# Patient Record
Sex: MALE | Race: WHITE | NOT HISPANIC OR LATINO | Employment: FULL TIME | URBAN - METROPOLITAN AREA
[De-identification: names, ages, dates, MRNs, and addresses within clinical notes are randomized per-mention and may not be internally consistent; named-entity substitution may affect disease eponyms.]

---

## 2019-08-07 ENCOUNTER — OFFICE VISIT (OUTPATIENT)
Dept: FAMILY MEDICINE | Facility: CLINIC | Age: 54
End: 2019-08-07
Payer: COMMERCIAL

## 2019-08-07 VITALS
SYSTOLIC BLOOD PRESSURE: 146 MMHG | HEIGHT: 72 IN | OXYGEN SATURATION: 98 % | DIASTOLIC BLOOD PRESSURE: 88 MMHG | HEART RATE: 105 BPM | TEMPERATURE: 98 F | WEIGHT: 260.25 LBS | BODY MASS INDEX: 35.25 KG/M2

## 2019-08-07 DIAGNOSIS — I80.9 PHLEBITIS ALONE: ICD-10-CM

## 2019-08-07 DIAGNOSIS — Z76.89 ENCOUNTER TO ESTABLISH CARE: Primary | ICD-10-CM

## 2019-08-07 DIAGNOSIS — I82.811 EMBOLISM FROM RIGHT GREATER SAPHENOUS VEIN: ICD-10-CM

## 2019-08-07 DIAGNOSIS — F17.200 SMOKER: ICD-10-CM

## 2019-08-07 DIAGNOSIS — I10 ESSENTIAL HYPERTENSION: ICD-10-CM

## 2019-08-07 DIAGNOSIS — N20.0 KIDNEY STONE: ICD-10-CM

## 2019-08-07 DIAGNOSIS — Z09 HOSPITAL DISCHARGE FOLLOW-UP: ICD-10-CM

## 2019-08-07 PROBLEM — I82.401 ACUTE DEEP VEIN THROMBOSIS (DVT) OF RIGHT LOWER EXTREMITY: Status: ACTIVE | Noted: 2019-08-07

## 2019-08-07 PROCEDURE — 3008F BODY MASS INDEX DOCD: CPT | Mod: CPTII,S$GLB,, | Performed by: FAMILY MEDICINE

## 2019-08-07 PROCEDURE — 99203 OFFICE O/P NEW LOW 30 MIN: CPT | Mod: S$GLB,,, | Performed by: FAMILY MEDICINE

## 2019-08-07 PROCEDURE — 99203 PR OFFICE/OUTPT VISIT, NEW, LEVL III, 30-44 MIN: ICD-10-PCS | Mod: S$GLB,,, | Performed by: FAMILY MEDICINE

## 2019-08-07 PROCEDURE — 3008F PR BODY MASS INDEX (BMI) DOCUMENTED: ICD-10-PCS | Mod: CPTII,S$GLB,, | Performed by: FAMILY MEDICINE

## 2019-08-07 RX ORDER — APIXABAN 5 MG/1
1 TABLET, FILM COATED ORAL DAILY
COMMUNITY
Start: 2019-08-06

## 2019-08-07 RX ORDER — LISINOPRIL 10 MG/1
10 TABLET ORAL DAILY
COMMUNITY
End: 2019-08-21

## 2019-08-07 RX ORDER — FUROSEMIDE 20 MG/1
1 TABLET ORAL DAILY
COMMUNITY
Start: 2019-07-09 | End: 2019-08-21

## 2019-08-07 NOTE — LETTER
August 7, 2019      [place] [address] [address] [place] [address]  Phone: [phone]  Fax: [phone]       Patient: Yaya Dykes   YOB: 1965  Date of Visit: 08/07/2019    To Whom It May Concern:    Jean Dykes  was at [facility] on 08/07/2019. He may return to work/school on 8/8/19 without restrictions. If you have any questions or concerns, or if I can be of further assistance, please do not hesitate to contact me.    Sincerely,    Parul Clement MD    pls note that I never restricted him from work- this is a new pt who as of right now has no restrictions placed upon him

## 2019-08-07 NOTE — PATIENT INSTRUCTIONS
Take the xarelto samples for your prophylactic treatment.  We will monitor the kidney stone - either go to ER or come to us for worsening symptoms such as blood in urine, pain in lower back etc.   Drink even more fluid than what you drink now.   No lasix necessary right now

## 2019-08-07 NOTE — PROGRESS NOTES
Subjective:       Patient ID: Yaya Dykes is a 54 y.o. male.    Chief Complaint: Leg Pain (Pt stated that he has had right leg pain. Pt stated that he went into the ER & was told he had superficial thrombophlebitis. Pt stated that lately his blood pressure has been running very low.)    55 yo M here to establish care. pMHx of HTN for which he takes lisinopril. Pt has not really been seen by PCP in the past. Last month pt went to [facility] ER for chest pain which turned out to be severe dehydration. Pt's creatinine was as high as > 5 and after 3 ltr of NS it was still around 3. GFR around 23 to 40s. Pt only micturated 440 ml during that time. Pt was discharged as it was assumed that pt has CKD - 2 weeks later went to hospitals ER for dehydration (low BP, feeling thirsty) - he was given 2 more liters of fluid and his kidneys NORMALIZED. He was also found to have a 9 mm non-obstructing kidney stone.   Also he was given an ultrasound of his right leg. Pt was found to have a superficial thrombosis in the great saphenous vein and proximity. Pt also has phlebitis.   As per guidelines (UpToDate), superficial thrombosis such as in the saphenous vein can be treated for 6 weeks as a preventive measure.     Review of Systems   Constitutional: Negative for chills, fatigue and fever.   HENT: Negative for congestion, drooling, sneezing and sore throat.    Eyes: Negative for pain and visual disturbance.   Respiratory: Negative for cough and shortness of breath.    Cardiovascular: Negative for chest pain.   Gastrointestinal: Negative for abdominal pain, constipation, diarrhea and nausea.   Endocrine: Negative for cold intolerance and heat intolerance.   Genitourinary: Negative for difficulty urinating and frequency.   Musculoskeletal: Negative for myalgias.   Allergic/Immunologic: Negative for food allergies.   Neurological: Negative for seizures and headaches.   Psychiatric/Behavioral: Negative for behavioral problems.        Objective:      Physical Exam   Constitutional: He appears well-developed.   HENT:   Right Ear: External ear normal.   Left Ear: External ear normal.   Mouth/Throat: Oropharynx is clear and moist.   Eyes: Conjunctivae and EOM are normal.   Neck: Normal range of motion.   Cardiovascular: Normal rate, regular rhythm and intact distal pulses.   Pulmonary/Chest: Effort normal and breath sounds normal.   Abdominal: Soft.   Musculoskeletal: Normal range of motion.   Neurological: He is alert.   Skin: Skin is warm. Capillary refill takes less than 2 seconds.   Psychiatric: He has a normal mood and affect.   Nursing note and vitals reviewed.      Assessment:       1. Encounter to establish care    2. Smoker    3. Hospital discharge follow-up    4. Kidney stone    5. Essential hypertension    6. Embolism from right greater saphenous vein        Plan:       PROBLEM LIST     Yaya was seen today for leg pain.    Diagnoses and all orders for this visit:    Encounter to establish care    Smoker    Hospital discharge follow-up    Kidney stone  Comments:  non-obstructing 9 mm mid-renal left kidney    Essential hypertension    Embolism from right greater saphenous vein  Comments:  8/2    Take the xarelto samples for your prophylactic treatment.  We will monitor the kidney stone - either go to ER or come to us for worsening symptoms such as blood in urine, pain in lower back etc.   Drink even more fluid than what you drink now.   No lasix necessary right now

## 2019-08-21 ENCOUNTER — OFFICE VISIT (OUTPATIENT)
Dept: FAMILY MEDICINE | Facility: CLINIC | Age: 54
End: 2019-08-21
Payer: COMMERCIAL

## 2019-08-21 VITALS
OXYGEN SATURATION: 97 % | HEIGHT: 72 IN | TEMPERATURE: 98 F | HEART RATE: 100 BPM | WEIGHT: 263.38 LBS | BODY MASS INDEX: 35.67 KG/M2 | SYSTOLIC BLOOD PRESSURE: 142 MMHG | DIASTOLIC BLOOD PRESSURE: 88 MMHG

## 2019-08-21 DIAGNOSIS — N40.0 BENIGN PROSTATIC HYPERPLASIA, UNSPECIFIED WHETHER LOWER URINARY TRACT SYMPTOMS PRESENT: ICD-10-CM

## 2019-08-21 DIAGNOSIS — I10 ESSENTIAL HYPERTENSION: ICD-10-CM

## 2019-08-21 DIAGNOSIS — Z09 HOSPITAL DISCHARGE FOLLOW-UP: Primary | ICD-10-CM

## 2019-08-21 DIAGNOSIS — N17.9 AKI (ACUTE KIDNEY INJURY): ICD-10-CM

## 2019-08-21 DIAGNOSIS — N20.0 KIDNEY STONE ON LEFT SIDE: ICD-10-CM

## 2019-08-21 PROCEDURE — 3008F PR BODY MASS INDEX (BMI) DOCUMENTED: ICD-10-PCS | Mod: CPTII,S$GLB,, | Performed by: FAMILY MEDICINE

## 2019-08-21 PROCEDURE — 99213 PR OFFICE/OUTPT VISIT, EST, LEVL III, 20-29 MIN: ICD-10-PCS | Mod: S$GLB,,, | Performed by: FAMILY MEDICINE

## 2019-08-21 PROCEDURE — 99213 OFFICE O/P EST LOW 20 MIN: CPT | Mod: S$GLB,,, | Performed by: FAMILY MEDICINE

## 2019-08-21 PROCEDURE — 3008F BODY MASS INDEX DOCD: CPT | Mod: CPTII,S$GLB,, | Performed by: FAMILY MEDICINE

## 2019-08-21 RX ORDER — GABAPENTIN 100 MG/1
1 CAPSULE ORAL 2 TIMES DAILY
COMMUNITY
Start: 2019-08-12 | End: 2019-11-04 | Stop reason: SDUPTHER

## 2019-08-21 RX ORDER — ONDANSETRON 4 MG/1
1 TABLET, ORALLY DISINTEGRATING ORAL
COMMUNITY
Start: 2019-08-09

## 2019-08-21 RX ORDER — TAMSULOSIN HYDROCHLORIDE 0.4 MG/1
1 CAPSULE ORAL DAILY
COMMUNITY
Start: 2019-08-12 | End: 2019-08-21 | Stop reason: SDUPTHER

## 2019-08-21 RX ORDER — FAMOTIDINE 20 MG/1
1 TABLET, FILM COATED ORAL DAILY
COMMUNITY
Start: 2019-08-12 | End: 2019-12-02

## 2019-08-21 RX ORDER — TAMSULOSIN HYDROCHLORIDE 0.4 MG/1
0.4 CAPSULE ORAL DAILY
Qty: 90 CAPSULE | Refills: 3 | Status: SHIPPED | OUTPATIENT
Start: 2019-08-21

## 2019-08-21 RX ORDER — AMLODIPINE BESYLATE 5 MG/1
1 TABLET ORAL DAILY
COMMUNITY
Start: 2019-08-12 | End: 2019-08-21 | Stop reason: SDUPTHER

## 2019-08-21 RX ORDER — AMLODIPINE BESYLATE 5 MG/1
5 TABLET ORAL DAILY
Qty: 90 TABLET | Refills: 3 | Status: SHIPPED | OUTPATIENT
Start: 2019-08-21 | End: 2019-11-04

## 2019-08-21 NOTE — PROGRESS NOTES
Subjective:       Patient ID: Yaya Dykes is a 54 y.o. male.    Chief Complaint: Hospital Follow Up (Pt is here to get a release to go back to work.)    55 yo M here for hospital discharge f/u for dehydration/kidney stones 10 days ago. Pt also had associated kidney failure. Pt got fluids and was put on flomax which he is still taking.   Pt also says that he does not have a blood clot anymore.   Pt was taken off of lisinopril and put on amlodipine 5 mg and flomax.   Pt feels well now and he is ready to go back to work. He is drinking a lot of water and he can urinate.   No other problems or concerns at this time.     Review of Systems   Constitutional: Negative for chills, fatigue and fever.   HENT: Negative for congestion, drooling, sneezing and sore throat.    Eyes: Negative for pain and visual disturbance.   Respiratory: Negative for cough and shortness of breath.    Cardiovascular: Negative for chest pain.   Gastrointestinal: Negative for abdominal pain, constipation, diarrhea and nausea.   Endocrine: Negative for cold intolerance and heat intolerance.   Genitourinary: Negative for difficulty urinating and frequency.   Musculoskeletal: Negative for myalgias.   Allergic/Immunologic: Negative for food allergies.   Neurological: Negative for seizures and headaches.   Psychiatric/Behavioral: Negative for behavioral problems.       Objective:      Physical Exam   Constitutional: He appears well-developed.   HENT:   Right Ear: External ear normal.   Left Ear: External ear normal.   Mouth/Throat: Oropharynx is clear and moist.   Eyes: Conjunctivae and EOM are normal.   Neck: Normal range of motion.   Cardiovascular: Normal rate, regular rhythm and intact distal pulses.   Pulmonary/Chest: Effort normal and breath sounds normal.   Abdominal: Soft.   Musculoskeletal: Normal range of motion.   Neurological: He is alert.   Skin: Skin is warm. Capillary refill takes less than 2 seconds.   Psychiatric: He has a normal mood  and affect.   Nursing note and vitals reviewed.      Assessment:       1. Hospital discharge follow-up    2. Essential hypertension    3. GINI (acute kidney injury)    4. Kidney stone on left side    5. Benign prostatic hyperplasia, unspecified whether lower urinary tract symptoms present        Plan:       PROBLEM LIST     Yaya was seen today for hospital follow up.    Diagnoses and all orders for this visit:    Hospital discharge follow-up  Comments:  for dehydration/kidney stones    Essential hypertension  -     amLODIPine (NORVASC) 5 MG tablet; Take 1 tablet (5 mg total) by mouth once daily.    GINI (acute kidney injury)    Kidney stone on left side    Benign prostatic hyperplasia, unspecified whether lower urinary tract symptoms present  -     tamsulosin (FLOMAX) 0.4 mg Cap; Take 1 capsule (0.4 mg total) by mouth once daily.

## 2019-08-21 NOTE — LETTER
August 21, 2019      [place] [address] [address] [place] [address]  Phone: [phone]  Fax: [phone]       Patient: Yaya Dykes   YOB: 1965  Date of Visit: 08/21/2019    To Whom It May Concern:    Jean Dykes  was at [facility] on 08/21/2019. He may return to work/school on 8/22/2019 without any restrictions. If you have any questions or concerns, or if I can be of further assistance, please do not hesitate to contact me.    Sincerely,    Parul Clement MD

## 2019-11-04 ENCOUNTER — OFFICE VISIT (OUTPATIENT)
Dept: FAMILY MEDICINE | Facility: CLINIC | Age: 54
End: 2019-11-04
Payer: COMMERCIAL

## 2019-11-04 VITALS
DIASTOLIC BLOOD PRESSURE: 102 MMHG | OXYGEN SATURATION: 98 % | HEART RATE: 78 BPM | TEMPERATURE: 98 F | WEIGHT: 261.13 LBS | HEIGHT: 72 IN | BODY MASS INDEX: 35.37 KG/M2 | SYSTOLIC BLOOD PRESSURE: 148 MMHG

## 2019-11-04 DIAGNOSIS — Z09 HOSPITAL DISCHARGE FOLLOW-UP: Primary | ICD-10-CM

## 2019-11-04 DIAGNOSIS — I82.811 EMBOLISM FROM RIGHT GREATER SAPHENOUS VEIN: ICD-10-CM

## 2019-11-04 DIAGNOSIS — F17.200 SMOKER: ICD-10-CM

## 2019-11-04 DIAGNOSIS — I21.9 MYOCARDIAL INFARCTION, UNSPECIFIED MI TYPE, UNSPECIFIED ARTERY: ICD-10-CM

## 2019-11-04 DIAGNOSIS — G62.9 NEUROPATHY: ICD-10-CM

## 2019-11-04 DIAGNOSIS — I10 ESSENTIAL HYPERTENSION: ICD-10-CM

## 2019-11-04 PROCEDURE — 3008F BODY MASS INDEX DOCD: CPT | Mod: CPTII,S$GLB,, | Performed by: FAMILY MEDICINE

## 2019-11-04 PROCEDURE — 99214 OFFICE O/P EST MOD 30 MIN: CPT | Mod: S$GLB,,, | Performed by: FAMILY MEDICINE

## 2019-11-04 PROCEDURE — 99214 PR OFFICE/OUTPT VISIT, EST, LEVL IV, 30-39 MIN: ICD-10-PCS | Mod: S$GLB,,, | Performed by: FAMILY MEDICINE

## 2019-11-04 PROCEDURE — 3008F PR BODY MASS INDEX (BMI) DOCUMENTED: ICD-10-PCS | Mod: CPTII,S$GLB,, | Performed by: FAMILY MEDICINE

## 2019-11-04 RX ORDER — GABAPENTIN 100 MG/1
300 CAPSULE ORAL 2 TIMES DAILY
Qty: 60 CAPSULE | Refills: 0 | Status: SHIPPED | OUTPATIENT
Start: 2019-11-04 | End: 2020-07-17

## 2019-11-04 RX ORDER — ATORVASTATIN CALCIUM 80 MG/1
1 TABLET, FILM COATED ORAL DAILY
COMMUNITY
Start: 2019-10-26

## 2019-11-04 RX ORDER — CARVEDILOL 6.25 MG/1
1 TABLET ORAL 2 TIMES DAILY
COMMUNITY
Start: 2019-10-26

## 2019-11-04 RX ORDER — AMLODIPINE BESYLATE 10 MG/1
10 TABLET ORAL DAILY
Qty: 30 TABLET | Refills: 0 | Status: SHIPPED | OUTPATIENT
Start: 2019-11-04

## 2019-11-04 RX ORDER — CLONIDINE HYDROCHLORIDE 0.1 MG/1
0.1 TABLET ORAL DAILY PRN
Qty: 30 TABLET | Refills: 0 | Status: SHIPPED | OUTPATIENT
Start: 2019-11-04

## 2019-11-04 RX ORDER — ASPIRIN 325 MG
325 TABLET ORAL DAILY
COMMUNITY

## 2019-11-04 RX ORDER — IBUPROFEN 200 MG
TABLET ORAL
COMMUNITY
Start: 2019-10-28

## 2019-11-04 NOTE — PROGRESS NOTES
"Subjective:       Patient ID: Yaya Dykes is a 54 y.o. male.    Chief Complaint: Hospital Follow Up (Pt stated that he went in to [facility] on 10/24/19 for a heart attack. Pt stated that he was in for only 1 day and Dr Nunez put in a stint due to 100% blockage.) and Medication Refill (Pt stated that he needs all his meds refilled sent to [facility] in [facility])    53 yo M here for hospital discharge f/u on MI. Pt had a heart attack 2 weeks ago - he felt a stabbing pain in his chest and he knew right away that he had a heart attack. He was airlifted to the hospital and he was given a stent in his right cardiac artery - drug eluting by Dr Nunez. Pt feels much better now and he has stopped smoking since that incident. He is on patches right now.   Pt still has some chest pain and he also has uncontrolled HTN. Discussed to increase his amlodipine to 10 mg and also have him take a clonidine if his BP goes over 160/90. Discussed that he really needs to keep his BP low. Pt thinks that it has been controlled till just about 2 days ago. He had an appt with his cardiologist and apparently his BP was alright at that time.  Pt will see his cardiologist for a stress test this coming Friday.   Pt would like to have gabapentin refilled and best increased if possible. He was put on gabapentin for his pain on the sides of his thigh. He states the meds work "soso".    Review of Systems   Constitutional: Negative for chills, fatigue and fever.   HENT: Negative for congestion, drooling, sneezing and sore throat.    Eyes: Negative for pain and visual disturbance.   Respiratory: Negative for cough and shortness of breath.    Cardiovascular: Negative for chest pain.   Gastrointestinal: Negative for abdominal pain, constipation, diarrhea and nausea.   Endocrine: Negative for cold intolerance and heat intolerance.   Genitourinary: Negative for difficulty urinating and frequency.   Musculoskeletal: Negative for myalgias. "   Allergic/Immunologic: Negative for food allergies.   Neurological: Negative for seizures and headaches.   Psychiatric/Behavioral: Negative for behavioral problems.       Objective:      Physical Exam   Constitutional: He appears well-developed.   HENT:   Right Ear: External ear normal.   Left Ear: External ear normal.   Mouth/Throat: Oropharynx is clear and moist.   Eyes: Conjunctivae and EOM are normal.   Neck: Normal range of motion.   Cardiovascular: Normal rate, regular rhythm and intact distal pulses.   Pulmonary/Chest: Effort normal and breath sounds normal.   Abdominal: Soft.   Musculoskeletal: Normal range of motion.   Neurological: He is alert.   Skin: Skin is warm. Capillary refill takes less than 2 seconds.   Psychiatric: He has a normal mood and affect.   Nursing note and vitals reviewed.      Assessment:       1. Hospital discharge follow-up    2. Myocardial infarction, unspecified MI type, unspecified artery    3. Essential hypertension    4. Embolism from right greater saphenous vein    5. Smoker    6. Neuropathy        Plan:       PROBLEM LIST     Yaya was seen today for hospital follow up and medication refill.    Diagnoses and all orders for this visit:    Hospital discharge follow-up    Myocardial infarction, unspecified MI type, unspecified artery  Comments:  drug eluding stent in RCA (Dr Nunez) 10/24/19    Essential hypertension  Comments:  not controlled  Orders:  -     amLODIPine (NORVASC) 10 MG tablet; Take 1 tablet (10 mg total) by mouth once daily.  -     cloNIDine (CATAPRES) 0.1 MG tablet; Take 1 tablet (0.1 mg total) by mouth daily as needed (chest pain).    Embolism from right greater saphenous vein    Smoker  Comments:  stoped since MI 10/24/19 - on patches currently    Neuropathy  -     gabapentin (NEURONTIN) 100 MG capsule; Take 3 capsules (300 mg total) by mouth 2 (two) times daily.

## 2019-11-04 NOTE — PATIENT INSTRUCTIONS
Ask Dr Nunez if nitro pills are okay to use, if he agrees you being on clonidine and if pletal is an option for you in addition to all of it.

## 2019-11-05 ENCOUNTER — TELEPHONE (OUTPATIENT)
Dept: UROLOGY | Facility: CLINIC | Age: 54
End: 2019-11-05

## 2019-11-05 NOTE — TELEPHONE ENCOUNTER
----- Message from Rosibel Brenner sent at 11/5/2019  1:08 PM CST -----  Contact: pt   Type:  Patient Returning Call    Who Called:pt  Who Left Message for Patient:na  Does the patient know what this is regarding?:na  Would the patient rather a call back or a response via [facility]? Call back   Best Call Back Number:[phone]   Additional Information: na

## 2019-11-05 NOTE — TELEPHONE ENCOUNTER
Spoke with patient, requesting lab/test results, states no follow up appt made, gave patient a follow up appt for 11/25 at 1141

## 2019-11-25 PROBLEM — Z09 HOSPITAL DISCHARGE FOLLOW-UP: Status: RESOLVED | Noted: 2019-08-21 | Resolved: 2019-11-25

## 2019-12-02 ENCOUNTER — OFFICE VISIT (OUTPATIENT)
Dept: FAMILY MEDICINE | Facility: CLINIC | Age: 54
End: 2019-12-02
Payer: COMMERCIAL

## 2019-12-02 VITALS
HEIGHT: 72 IN | TEMPERATURE: 99 F | OXYGEN SATURATION: 96 % | BODY MASS INDEX: 35.84 KG/M2 | HEART RATE: 91 BPM | DIASTOLIC BLOOD PRESSURE: 92 MMHG | RESPIRATION RATE: 16 BRPM | WEIGHT: 264.63 LBS | SYSTOLIC BLOOD PRESSURE: 144 MMHG

## 2019-12-02 DIAGNOSIS — K21.9 GASTROESOPHAGEAL REFLUX DISEASE, ESOPHAGITIS PRESENCE NOT SPECIFIED: ICD-10-CM

## 2019-12-02 DIAGNOSIS — R07.9 CHEST PAIN, UNSPECIFIED TYPE: ICD-10-CM

## 2019-12-02 DIAGNOSIS — R60.9 SWELLING: ICD-10-CM

## 2019-12-02 DIAGNOSIS — Z95.5 H/O HEART ARTERY STENT: ICD-10-CM

## 2019-12-02 DIAGNOSIS — I21.9 MYOCARDIAL INFARCTION, UNSPECIFIED MI TYPE, UNSPECIFIED ARTERY: Primary | ICD-10-CM

## 2019-12-02 PROCEDURE — 99214 OFFICE O/P EST MOD 30 MIN: CPT | Mod: S$GLB,,, | Performed by: FAMILY MEDICINE

## 2019-12-02 PROCEDURE — 3008F BODY MASS INDEX DOCD: CPT | Mod: CPTII,S$GLB,, | Performed by: FAMILY MEDICINE

## 2019-12-02 PROCEDURE — 3008F PR BODY MASS INDEX (BMI) DOCUMENTED: ICD-10-PCS | Mod: CPTII,S$GLB,, | Performed by: FAMILY MEDICINE

## 2019-12-02 PROCEDURE — 99214 PR OFFICE/OUTPT VISIT, EST, LEVL IV, 30-39 MIN: ICD-10-PCS | Mod: S$GLB,,, | Performed by: FAMILY MEDICINE

## 2019-12-02 RX ORDER — FUROSEMIDE 20 MG/1
20 TABLET ORAL DAILY PRN
Qty: 30 TABLET | Refills: 11 | Status: SHIPPED | OUTPATIENT
Start: 2019-12-02 | End: 2020-12-01

## 2019-12-02 RX ORDER — ESOMEPRAZOLE MAGNESIUM 40 MG/1
40 CAPSULE, DELAYED RELEASE ORAL
Qty: 30 CAPSULE | Refills: 11 | Status: SHIPPED | OUTPATIENT
Start: 2019-12-02 | End: 2020-12-01

## 2019-12-02 NOTE — PROGRESS NOTES
"Subjective:       Patient ID: Yaya Dykes is a 54 y.o. male.    Chief Complaint: Follow-up (pt is here to follow up on his BP medication.); Shoulder Pain (pt states that at times his left shoulder hurts); Chest Pain (pt states that at night he gets chest pains and also throws up during the night while sleeping. ); and Edema (pt states that his hands and legs are swelling alot. )    53 yo M here for f/u on HTN and on other symptoms that are similar to the ones he had just prior to when he was dx'd with MI and got a stent in his RCA.  Pt has Hx of MI w/ stent 1 month ago. Pt has CP now x 2 weeks - did not have it when he saw Dr Nunez 2+ weeks ago. Now he has CP which is similar to the CP before he got the stent as well as "puking" in AM - just like before the MI. He now also has shoulder pain which is a little different from before. Pt thinks it is more like indigestion this time even though it is the same kind of problems from before the MI.   Pt also has experienced increased swelling in his fingers and toes, augusto in morning. He had been on lasix in the past but it was not continued when he saw his cardiologist as he had no edema at that time. It started after I started him on amlodipine which has AE of edema.       Review of Systems   Constitutional: Negative for chills, fatigue and fever.   HENT: Negative for congestion, drooling, sneezing and sore throat.    Eyes: Negative for pain and visual disturbance.   Respiratory: Negative for cough and shortness of breath.    Cardiovascular: Positive for leg swelling. Negative for chest pain.   Gastrointestinal: Negative for abdominal pain, constipation, diarrhea and nausea.   Endocrine: Negative for cold intolerance and heat intolerance.   Genitourinary: Negative for difficulty urinating and frequency.   Musculoskeletal: Negative for myalgias.   Allergic/Immunologic: Negative for food allergies.   Neurological: Negative for seizures and headaches. "   Psychiatric/Behavioral: Negative for behavioral problems.       Objective:      Physical Exam   Constitutional: He appears well-developed.   HENT:   Right Ear: External ear normal.   Left Ear: External ear normal.   Mouth/Throat: Oropharynx is clear and moist.   Eyes: Conjunctivae and EOM are normal.   Neck: Normal range of motion.   Cardiovascular: Normal rate, regular rhythm and intact distal pulses.   Pulmonary/Chest: Effort normal and breath sounds normal.   Abdominal: Soft.   Musculoskeletal: Normal range of motion.   Neurological: He is alert.   Skin: Skin is warm. Capillary refill takes less than 2 seconds.   Psychiatric: He has a normal mood and affect.   Nursing note and vitals reviewed.      Assessment:       1. Myocardial infarction, unspecified MI type, unspecified artery    2. H/O heart artery stent    3. Gastroesophageal reflux disease, esophagitis presence not specified    4. Chest pain, unspecified type    5. Swelling        Plan:       PROBLEM LIST     Yaya was seen today for follow-up, shoulder pain, chest pain and edema.    Diagnoses and all orders for this visit:    Myocardial infarction, unspecified MI type, unspecified artery  Comments:  drug eluding stent in RCA 10/19  Orders:  -     furosemide (LASIX) 20 MG tablet; Take 1 tablet (20 mg total) by mouth daily as needed (weight gain).    H/O heart artery stent  Comments:  drug eluding - everolimus  Orders:  -     furosemide (LASIX) 20 MG tablet; Take 1 tablet (20 mg total) by mouth daily as needed (weight gain).    Gastroesophageal reflux disease, esophagitis presence not specified  Comments:  we'll try nexium  Orders:  -     esomeprazole (NEXIUM) 40 MG capsule; Take 1 capsule (40 mg total) by mouth before breakfast.    Chest pain, unspecified type  Comments:  go to ER if CP get more like the ones you had before MI    Swelling  Comments:   2ry amlodipine, back on lasix prn  Orders:  -     furosemide (LASIX) 20 MG tablet; Take 1 tablet (20 mg  total) by mouth daily as needed (weight gain).    Clonidine is for super high BP - this is an emergency medication    Please find out which blood thinner you are on - eliquis or xarelto    Esomeprazole is for your reflux disease.     If the esomeprazole does not help with your CP and throwing up episodes and you have more symptoms just like you had before you were dx'd with a heart attack, please go see cardiologist or ER.

## 2019-12-02 NOTE — PATIENT INSTRUCTIONS
Clonidine is for super high BP - this is an emergency medication    Please find out which blood thinner you are on - eliquis or xarelto    Esomeprazole is for your reflux disease.     If the esomeprazole does not help with your CP and throwing up episodes and you have more symptoms just like you had before you were dx'd with a heart attack, please go see cardiologist or ER.

## 2020-07-17 DIAGNOSIS — G62.9 NEUROPATHY: ICD-10-CM

## 2020-07-17 RX ORDER — GABAPENTIN 100 MG/1
CAPSULE ORAL
Qty: 60 CAPSULE | Refills: 0 | Status: SHIPPED | OUTPATIENT
Start: 2020-07-17

## 2020-10-02 ENCOUNTER — TELEPHONE (OUTPATIENT)
Dept: FAMILY MEDICINE | Facility: CLINIC | Age: 55
End: 2020-10-02

## 2020-10-02 NOTE — TELEPHONE ENCOUNTER
I called pt back and he states that his calves and ankles are swelling and said that his vericose veins were all blue and states on the right foot all his toes are purple. States that his feet are tingling and when he steps on the floor barefoot it hurts real bad. I spoke with Dr. Clement and she said that he will need to call and get into Dr. Tyson his cardiovascular specialist. I called him back and LVM letting him know to do that.

## 2020-10-02 NOTE — TELEPHONE ENCOUNTER
----- Message from Rosibel Brenner sent at 10/1/2020 10:19 AM CDT -----  Type:  Needs Medical Advice    Who Called: pt   Symptoms (please be specific): bruises on feet    How long has patient had these symptoms:  few days   Pharmacy name and phone #:    Would the patient rather a call back or a response via [facility]? Callback   Best Call Back Number: [phone]  Additional Information: